# Patient Record
Sex: FEMALE
[De-identification: names, ages, dates, MRNs, and addresses within clinical notes are randomized per-mention and may not be internally consistent; named-entity substitution may affect disease eponyms.]

---

## 2024-05-22 ENCOUNTER — APPOINTMENT (OUTPATIENT)
Dept: UROLOGY | Facility: CLINIC | Age: 69
End: 2024-05-22
Payer: MEDICARE

## 2024-05-22 VITALS
BODY MASS INDEX: 32 KG/M2 | DIASTOLIC BLOOD PRESSURE: 73 MMHG | HEART RATE: 68 BPM | SYSTOLIC BLOOD PRESSURE: 121 MMHG | WEIGHT: 163 LBS | HEIGHT: 60 IN | OXYGEN SATURATION: 98 % | TEMPERATURE: 97.1 F

## 2024-05-22 DIAGNOSIS — Z80.0 FAMILY HISTORY OF MALIGNANT NEOPLASM OF DIGESTIVE ORGANS: ICD-10-CM

## 2024-05-22 DIAGNOSIS — M79.2 NEURALGIA AND NEURITIS, UNSPECIFIED: ICD-10-CM

## 2024-05-22 DIAGNOSIS — Z78.9 OTHER SPECIFIED HEALTH STATUS: ICD-10-CM

## 2024-05-22 DIAGNOSIS — Z85.22 PERSONAL HISTORY OF MALIGNANT NEOPLASM OF NASAL CAVITIES, MIDDLE EAR, AND ACCESSORY SINUSES: ICD-10-CM

## 2024-05-22 PROCEDURE — 99203 OFFICE O/P NEW LOW 30 MIN: CPT

## 2024-05-22 RX ORDER — GABAPENTIN 600 MG/1
600 TABLET ORAL
Refills: 0 | Status: ACTIVE | COMMUNITY

## 2024-05-22 RX ORDER — DICLOFENAC SODIUM 20 MG/G
SOLUTION TOPICAL
Refills: 0 | Status: ACTIVE | COMMUNITY

## 2024-05-22 RX ORDER — ROSUVASTATIN CALCIUM 10 MG/1
10 TABLET, FILM COATED ORAL
Refills: 0 | Status: ACTIVE | COMMUNITY

## 2024-05-22 RX ORDER — DOCUSATE SODIUM 100 MG/1
CAPSULE ORAL
Refills: 0 | Status: ACTIVE | COMMUNITY

## 2024-05-22 RX ORDER — CICLOPIROX OLAMINE 7.7 MG/G
CREAM TOPICAL
Refills: 0 | Status: ACTIVE | COMMUNITY

## 2024-05-28 LAB
APPEARANCE: CLEAR
BACTERIA UR CULT: NORMAL
BACTERIA: NEGATIVE /HPF
BILIRUBIN URINE: NEGATIVE
BLOOD URINE: NEGATIVE
CAST: 0 /LPF
COLOR: YELLOW
EPITHELIAL CELLS: 1 /HPF
GLUCOSE QUALITATIVE U: NEGATIVE MG/DL
KETONES URINE: NEGATIVE MG/DL
LEUKOCYTE ESTERASE URINE: ABNORMAL
MICROSCOPIC-UA: NORMAL
NITRITE URINE: NEGATIVE
PH URINE: 7.5
PROTEIN URINE: NORMAL MG/DL
RED BLOOD CELLS URINE: 1 /HPF
SPECIFIC GRAVITY URINE: 1.02
UROBILINOGEN URINE: 1 MG/DL
WHITE BLOOD CELLS URINE: 4 /HPF

## 2024-05-28 NOTE — HISTORY OF PRESENT ILLNESS
[FreeTextEntry1] : cc pressure in bladder  symptoms for last 6 weeks  sp pressure  has some hesitancy difficulty emptying  , feels inflamed urethra , urgency  had some mild tamiko  right lower back pain  no dysuria  given abx by pmd no cx  improved but symptoms return  took another abx for 7 days  symptoms return   no h/o kidney stone  nofwever

## 2024-05-29 ENCOUNTER — APPOINTMENT (OUTPATIENT)
Dept: UROLOGY | Facility: CLINIC | Age: 69
End: 2024-05-29
Payer: MEDICARE

## 2024-05-29 VITALS
HEART RATE: 68 BPM | HEIGHT: 60 IN | SYSTOLIC BLOOD PRESSURE: 129 MMHG | OXYGEN SATURATION: 98 % | TEMPERATURE: 97.3 F | WEIGHT: 163 LBS | DIASTOLIC BLOOD PRESSURE: 75 MMHG | BODY MASS INDEX: 32 KG/M2

## 2024-05-29 DIAGNOSIS — R39.9 UNSPECIFIED SYMPTOMS AND SIGNS INVOLVING THE GENITOURINARY SYSTEM: ICD-10-CM

## 2024-05-29 PROCEDURE — 76775 US EXAM ABDO BACK WALL LIM: CPT

## 2024-05-29 PROCEDURE — 99213 OFFICE O/P EST LOW 20 MIN: CPT

## 2024-05-29 NOTE — ASSESSMENT
[FreeTextEntry1] : 69 year old female with reviewing renal U/S results. Reviewed renal U/S results with pt..   Informed pt cyst on kidney is benign that kidneys are normal/benign, bladder is normal.  Recommended pt to keep using estragon cream for dryness and that is normal with menopause.  RTO if sx are present

## 2024-05-29 NOTE — END OF VISIT
[FreeTextEntry4] : This note was written by Scott Alvarez on 05/29/2024 actively solely Cali Yuan M.D. I, Scott Alvarez, am scribing for and in the presence of Cali Yuan M.D. in the following sections HISTORY OF PRESENT ILLNESS, PAST MEDICAL/FAMILY/SOCIAL HISTORY; REVIEW OF SYSTEMS; VITAL SIGNS; PHYSICAL EXAM; ASSESSMENT/PLAN.  All medical record entries made by this scribe at my, Cali Yuan M.D. direction and personally dictated by me on 05/29/2024. I personally performed the services described in the documentation, reviewed the documentation recorded by the scribe in my presence, and it accurately and completely records my words and actions.

## 2024-05-29 NOTE — HISTORY OF PRESENT ILLNESS
[FreeTextEntry1] : 5/22/2024 cc pressure in bladder symptoms for last 6 weeks sp pressure has some hesitancy difficulty emptying , feels inflamed urethra , urgency had some mild tamiko right lower back pain no dysuria given abx by pmd no cx improved but symptoms return took another abx for 7 days symptoms return  no h/o kidney stone no fever  05/29/2024 cc: review U/S images 69 year old female presents with reviewing renal U/S images. She states she went to the gynecologist for dryness and used estradiol cream and Epsom salt baths.   ua:negative

## 2024-06-03 PROBLEM — R39.9 LOWER URINARY TRACT SYMPTOMS (LUTS): Status: ACTIVE | Noted: 2024-05-22

## 2024-07-29 ENCOUNTER — APPOINTMENT (OUTPATIENT)
Dept: UROLOGY | Facility: CLINIC | Age: 69
End: 2024-07-29
Payer: MEDICARE

## 2024-07-29 VITALS
DIASTOLIC BLOOD PRESSURE: 90 MMHG | BODY MASS INDEX: 32 KG/M2 | OXYGEN SATURATION: 98 % | HEART RATE: 62 BPM | SYSTOLIC BLOOD PRESSURE: 152 MMHG | TEMPERATURE: 97 F | WEIGHT: 163 LBS | HEIGHT: 60 IN

## 2024-07-29 DIAGNOSIS — N39.0 URINARY TRACT INFECTION, SITE NOT SPECIFIED: ICD-10-CM

## 2024-07-29 DIAGNOSIS — R39.14 FEELING OF INCOMPLETE BLADDER EMPTYING: ICD-10-CM

## 2024-07-29 LAB
BILIRUB UR QL STRIP: NEGATIVE
GLUCOSE UR-MCNC: NEGATIVE
HCG UR QL: 1 EU/DL
HGB UR QL STRIP.AUTO: NEGATIVE
KETONES UR-MCNC: NEGATIVE
LEUKOCYTE ESTERASE UR QL STRIP: NEGATIVE
NITRITE UR QL STRIP: NEGATIVE
PH UR STRIP: 7
PROT UR STRIP-MCNC: NEGATIVE
SP GR UR STRIP: 1.02

## 2024-07-29 PROCEDURE — G2211 COMPLEX E/M VISIT ADD ON: CPT

## 2024-07-29 PROCEDURE — 99215 OFFICE O/P EST HI 40 MIN: CPT

## 2024-07-29 PROCEDURE — 99459 PELVIC EXAMINATION: CPT

## 2024-07-29 PROCEDURE — 81003 URINALYSIS AUTO W/O SCOPE: CPT | Mod: QW

## 2024-07-29 PROCEDURE — 51798 US URINE CAPACITY MEASURE: CPT

## 2024-07-29 NOTE — HISTORY OF PRESENT ILLNESS
[FreeTextEntry1] : 69F presents for initial evaluation of bladder prolapse   Self-referred   PMH significant for: neoplasm of nasal cavity, neuralgia   PSH significant for: hysterectomy, , nose surgery  Significant meds: gabapentin, rosuvastatin   Seen by Dr. Hoyt for urethral inflammation, bladder pressure, difficulty emptying  Recommended estrace cream   Patient reports No history of prolapse symptoms  Was told that she has a bladder prolapse by GYN Reports moderate level of distress  Pelvic Heaviness: No Vaginal Bulge: No Splinting: No Associated with: No Previously treated with: Pessary, poorly tolerated History of Abnormal PAP: n/a Amenable to Hysterectomy: , fibroids Wishes to maintain sexual function: Yes  Patient reports [id number] in the past year  There have been 0 culture-proven infections  Offending bacteria include: unknown Acute Symptoms of: pelvic pressure  Symptoms resolve with antibiotics. Denies associated hospitalizations.  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

## 2024-07-29 NOTE — PHYSICAL EXAM
[General Appearance - Well Developed] : well developed [General Appearance - Well Nourished] : well nourished [] : no respiratory distress [Urethral Meatus] : the meatus of the urethra showed no abnormalities [Normal Station and Gait] : the gait and station were normal for the patient's age [FreeTextEntry1] : Chaperone: IZABELLA Collins [de-identified] : no prolapse, mild BL levator spasm

## 2024-07-31 ENCOUNTER — NON-APPOINTMENT (OUTPATIENT)
Age: 69
End: 2024-07-31

## 2024-08-05 LAB — BACTERIA UR CULT: ABNORMAL

## 2024-10-29 ENCOUNTER — APPOINTMENT (OUTPATIENT)
Dept: UROLOGY | Facility: CLINIC | Age: 69
End: 2024-10-29
Payer: MEDICARE

## 2024-10-29 VITALS
SYSTOLIC BLOOD PRESSURE: 144 MMHG | BODY MASS INDEX: 31.41 KG/M2 | WEIGHT: 160 LBS | OXYGEN SATURATION: 98 % | HEIGHT: 60 IN | HEART RATE: 61 BPM | TEMPERATURE: 97.5 F | DIASTOLIC BLOOD PRESSURE: 72 MMHG

## 2024-10-29 DIAGNOSIS — M62.838 OTHER MUSCLE SPASM: ICD-10-CM

## 2024-10-29 DIAGNOSIS — N32.81 OVERACTIVE BLADDER: ICD-10-CM

## 2024-10-29 PROCEDURE — G2211 COMPLEX E/M VISIT ADD ON: CPT

## 2024-10-29 PROCEDURE — 99459 PELVIC EXAMINATION: CPT

## 2024-10-29 PROCEDURE — 99214 OFFICE O/P EST MOD 30 MIN: CPT

## 2024-10-29 PROCEDURE — 51798 US URINE CAPACITY MEASURE: CPT

## 2024-10-29 RX ORDER — OXYBUTYNIN CHLORIDE 10 MG/1
10 TABLET, EXTENDED RELEASE ORAL
Qty: 30 | Refills: 6 | Status: ACTIVE | COMMUNITY
Start: 2024-10-29 | End: 1900-01-01

## 2024-11-19 ENCOUNTER — APPOINTMENT (OUTPATIENT)
Dept: UROLOGY | Facility: CLINIC | Age: 69
End: 2024-11-19

## 2024-11-26 ENCOUNTER — APPOINTMENT (OUTPATIENT)
Dept: UROLOGY | Facility: CLINIC | Age: 69
End: 2024-11-26

## 2025-07-21 ENCOUNTER — EMERGENCY (EMERGENCY)
Facility: HOSPITAL | Age: 70
LOS: 1 days | End: 2025-07-21
Attending: STUDENT IN AN ORGANIZED HEALTH CARE EDUCATION/TRAINING PROGRAM
Payer: MEDICARE

## 2025-07-21 VITALS
SYSTOLIC BLOOD PRESSURE: 152 MMHG | RESPIRATION RATE: 17 BRPM | OXYGEN SATURATION: 97 % | HEART RATE: 64 BPM | DIASTOLIC BLOOD PRESSURE: 81 MMHG | TEMPERATURE: 98 F

## 2025-07-21 VITALS
TEMPERATURE: 99 F | RESPIRATION RATE: 18 BRPM | SYSTOLIC BLOOD PRESSURE: 160 MMHG | DIASTOLIC BLOOD PRESSURE: 93 MMHG | HEART RATE: 87 BPM | OXYGEN SATURATION: 99 %

## 2025-07-21 LAB
ALBUMIN SERPL ELPH-MCNC: 4.7 G/DL — SIGNIFICANT CHANGE UP (ref 3.3–5)
ALP SERPL-CCNC: 119 U/L — SIGNIFICANT CHANGE UP (ref 40–120)
ALT FLD-CCNC: 19 U/L — SIGNIFICANT CHANGE UP (ref 10–45)
ANION GAP SERPL CALC-SCNC: 15 MMOL/L — SIGNIFICANT CHANGE UP (ref 5–17)
APTT BLD: 38.6 SEC — HIGH (ref 26.1–36.8)
AST SERPL-CCNC: 17 U/L — SIGNIFICANT CHANGE UP (ref 10–40)
BASOPHILS # BLD AUTO: 0.02 K/UL — SIGNIFICANT CHANGE UP (ref 0–0.2)
BASOPHILS NFR BLD AUTO: 0.3 % — SIGNIFICANT CHANGE UP (ref 0–2)
BILIRUB SERPL-MCNC: 0.3 MG/DL — SIGNIFICANT CHANGE UP (ref 0.2–1.2)
BUN SERPL-MCNC: 11 MG/DL — SIGNIFICANT CHANGE UP (ref 7–23)
CALCIUM SERPL-MCNC: 9.6 MG/DL — SIGNIFICANT CHANGE UP (ref 8.4–10.5)
CHLORIDE SERPL-SCNC: 106 MMOL/L — SIGNIFICANT CHANGE UP (ref 96–108)
CO2 SERPL-SCNC: 22 MMOL/L — SIGNIFICANT CHANGE UP (ref 22–31)
CREAT SERPL-MCNC: 0.48 MG/DL — LOW (ref 0.5–1.3)
EGFR: 101 ML/MIN/1.73M2 — SIGNIFICANT CHANGE UP
EGFR: 101 ML/MIN/1.73M2 — SIGNIFICANT CHANGE UP
EOSINOPHIL # BLD AUTO: 0.1 K/UL — SIGNIFICANT CHANGE UP (ref 0–0.5)
EOSINOPHIL NFR BLD AUTO: 1.3 % — SIGNIFICANT CHANGE UP (ref 0–6)
GLUCOSE SERPL-MCNC: 118 MG/DL — HIGH (ref 70–99)
HCT VFR BLD CALC: 41.1 % — SIGNIFICANT CHANGE UP (ref 34.5–45)
HGB BLD-MCNC: 13.6 G/DL — SIGNIFICANT CHANGE UP (ref 11.5–15.5)
IMM GRANULOCYTES # BLD AUTO: 0.02 K/UL — SIGNIFICANT CHANGE UP (ref 0–0.07)
IMM GRANULOCYTES NFR BLD AUTO: 0.3 % — SIGNIFICANT CHANGE UP (ref 0–0.9)
INR BLD: 0.93 RATIO — SIGNIFICANT CHANGE UP (ref 0.85–1.16)
LYMPHOCYTES # BLD AUTO: 1.75 K/UL — SIGNIFICANT CHANGE UP (ref 1–3.3)
LYMPHOCYTES NFR BLD AUTO: 23 % — SIGNIFICANT CHANGE UP (ref 13–44)
MCHC RBC-ENTMCNC: 29.4 PG — SIGNIFICANT CHANGE UP (ref 27–34)
MCHC RBC-ENTMCNC: 33.1 G/DL — SIGNIFICANT CHANGE UP (ref 32–36)
MCV RBC AUTO: 88.8 FL — SIGNIFICANT CHANGE UP (ref 80–100)
MONOCYTES # BLD AUTO: 0.34 K/UL — SIGNIFICANT CHANGE UP (ref 0–0.9)
MONOCYTES NFR BLD AUTO: 4.5 % — SIGNIFICANT CHANGE UP (ref 2–14)
NEUTROPHILS # BLD AUTO: 5.38 K/UL — SIGNIFICANT CHANGE UP (ref 1.8–7.4)
NEUTROPHILS NFR BLD AUTO: 70.6 % — SIGNIFICANT CHANGE UP (ref 43–77)
NRBC # BLD AUTO: 0 K/UL — SIGNIFICANT CHANGE UP (ref 0–0)
NRBC # FLD: 0 K/UL — SIGNIFICANT CHANGE UP (ref 0–0)
NRBC BLD AUTO-RTO: 0 /100 WBCS — SIGNIFICANT CHANGE UP (ref 0–0)
PLATELET # BLD AUTO: 252 K/UL — SIGNIFICANT CHANGE UP (ref 150–400)
PMV BLD: 10.8 FL — SIGNIFICANT CHANGE UP (ref 7–13)
POTASSIUM SERPL-MCNC: 4 MMOL/L — SIGNIFICANT CHANGE UP (ref 3.5–5.3)
POTASSIUM SERPL-SCNC: 4 MMOL/L — SIGNIFICANT CHANGE UP (ref 3.5–5.3)
PROT SERPL-MCNC: 7.9 G/DL — SIGNIFICANT CHANGE UP (ref 6–8.3)
PROTHROM AB SERPL-ACNC: 10.7 SEC — SIGNIFICANT CHANGE UP (ref 9.9–13.4)
RBC # BLD: 4.63 M/UL — SIGNIFICANT CHANGE UP (ref 3.8–5.2)
RBC # FLD: 12.9 % — SIGNIFICANT CHANGE UP (ref 10.3–14.5)
SODIUM SERPL-SCNC: 143 MMOL/L — SIGNIFICANT CHANGE UP (ref 135–145)
TROPONIN T, HIGH SENSITIVITY RESULT: <6 NG/L — SIGNIFICANT CHANGE UP (ref 0–51)
WBC # BLD: 7.61 K/UL — SIGNIFICANT CHANGE UP (ref 3.8–10.5)
WBC # FLD AUTO: 7.61 K/UL — SIGNIFICANT CHANGE UP (ref 3.8–10.5)

## 2025-07-21 PROCEDURE — 70498 CT ANGIOGRAPHY NECK: CPT

## 2025-07-21 PROCEDURE — 99285 EMERGENCY DEPT VISIT HI MDM: CPT | Mod: 25

## 2025-07-21 PROCEDURE — 85025 COMPLETE CBC W/AUTO DIFF WBC: CPT

## 2025-07-21 PROCEDURE — 85610 PROTHROMBIN TIME: CPT

## 2025-07-21 PROCEDURE — 82962 GLUCOSE BLOOD TEST: CPT

## 2025-07-21 PROCEDURE — 70496 CT ANGIOGRAPHY HEAD: CPT

## 2025-07-21 PROCEDURE — 96374 THER/PROPH/DIAG INJ IV PUSH: CPT | Mod: XU

## 2025-07-21 PROCEDURE — 70496 CT ANGIOGRAPHY HEAD: CPT | Mod: 26

## 2025-07-21 PROCEDURE — 0042T: CPT

## 2025-07-21 PROCEDURE — 80053 COMPREHEN METABOLIC PANEL: CPT

## 2025-07-21 PROCEDURE — 82947 ASSAY GLUCOSE BLOOD QUANT: CPT

## 2025-07-21 PROCEDURE — 85730 THROMBOPLASTIN TIME PARTIAL: CPT

## 2025-07-21 PROCEDURE — 36415 COLL VENOUS BLD VENIPUNCTURE: CPT

## 2025-07-21 PROCEDURE — 83605 ASSAY OF LACTIC ACID: CPT

## 2025-07-21 PROCEDURE — 85018 HEMOGLOBIN: CPT

## 2025-07-21 PROCEDURE — 70450 CT HEAD/BRAIN W/O DYE: CPT

## 2025-07-21 PROCEDURE — 93005 ELECTROCARDIOGRAM TRACING: CPT

## 2025-07-21 PROCEDURE — 82435 ASSAY OF BLOOD CHLORIDE: CPT

## 2025-07-21 PROCEDURE — 84132 ASSAY OF SERUM POTASSIUM: CPT

## 2025-07-21 PROCEDURE — 96375 TX/PRO/DX INJ NEW DRUG ADDON: CPT | Mod: XU

## 2025-07-21 PROCEDURE — 70498 CT ANGIOGRAPHY NECK: CPT | Mod: 26

## 2025-07-21 PROCEDURE — 84484 ASSAY OF TROPONIN QUANT: CPT

## 2025-07-21 PROCEDURE — 85014 HEMATOCRIT: CPT

## 2025-07-21 PROCEDURE — 82803 BLOOD GASES ANY COMBINATION: CPT

## 2025-07-21 PROCEDURE — 70450 CT HEAD/BRAIN W/O DYE: CPT | Mod: 26,XU

## 2025-07-21 PROCEDURE — 93010 ELECTROCARDIOGRAM REPORT: CPT

## 2025-07-21 PROCEDURE — 84295 ASSAY OF SERUM SODIUM: CPT

## 2025-07-21 PROCEDURE — 82330 ASSAY OF CALCIUM: CPT

## 2025-07-21 PROCEDURE — 99285 EMERGENCY DEPT VISIT HI MDM: CPT

## 2025-07-21 RX ORDER — SODIUM CHLORIDE 9 G/1000ML
1000 INJECTION, SOLUTION INTRAVENOUS ONCE
Refills: 0 | Status: COMPLETED | OUTPATIENT
Start: 2025-07-21 | End: 2025-07-21

## 2025-07-21 RX ORDER — KETOROLAC TROMETHAMINE 30 MG/ML
15 INJECTION, SOLUTION INTRAMUSCULAR; INTRAVENOUS ONCE
Refills: 0 | Status: DISCONTINUED | OUTPATIENT
Start: 2025-07-21 | End: 2025-07-21

## 2025-07-21 RX ORDER — METOCLOPRAMIDE HCL 10 MG
10 TABLET ORAL ONCE
Refills: 0 | Status: COMPLETED | OUTPATIENT
Start: 2025-07-21 | End: 2025-07-21

## 2025-07-21 RX ORDER — ACETAMINOPHEN 500 MG/5ML
1000 LIQUID (ML) ORAL ONCE
Refills: 0 | Status: COMPLETED | OUTPATIENT
Start: 2025-07-21 | End: 2025-07-21

## 2025-07-21 RX ADMIN — SODIUM CHLORIDE 1000 MILLILITER(S): 9 INJECTION, SOLUTION INTRAVENOUS at 15:40

## 2025-07-21 RX ADMIN — KETOROLAC TROMETHAMINE 15 MILLIGRAM(S): 30 INJECTION, SOLUTION INTRAMUSCULAR; INTRAVENOUS at 15:41

## 2025-07-21 RX ADMIN — Medication 10 MILLIGRAM(S): at 15:41

## 2025-07-21 RX ADMIN — Medication 400 MILLIGRAM(S): at 15:41

## 2025-07-21 RX ADMIN — Medication 1000 MILLIGRAM(S): at 16:42

## 2025-07-21 RX ADMIN — KETOROLAC TROMETHAMINE 15 MILLIGRAM(S): 30 INJECTION, SOLUTION INTRAMUSCULAR; INTRAVENOUS at 16:42

## 2025-07-21 NOTE — ED ADULT NURSE NOTE - NSFALLUNIVINTERV_ED_ALL_ED
Bed/Stretcher in lowest position, wheels locked, appropriate side rails in place/Call bell, personal items and telephone in reach/Instruct patient to call for assistance before getting out of bed/chair/stretcher/Non-slip footwear applied when patient is off stretcher/[place] to call system/Physically safe environment - no spills, clutter or unnecessary equipment/Purposeful proactive rounding/Room/bathroom lighting operational, light cord in reach

## 2025-07-21 NOTE — ED PROVIDER NOTE - CLINICAL SUMMARY MEDICAL DECISION MAKING FREE TEXT BOX
70-year-old female with history of hypertension, hyperlipidemia, Bell's palsy with baseline right facial droop, diabetes on metformin brought in by EMS from doctor's office for acute onset occipital headache.   Patient was accompanied  to his doctor's appointment around 11:30 AM when she experienced sudden onset posterior headache, severe.  No vision change, weakness, numbness, tingling or new facial droop. Accompanied by daughter at bedside who reports patient at the same time was confused, forgetful about questions but AAO X4 still.  Concerned as patient at baseline is not forgetful. Code stroke at triage.  Vitals on presentation nonactionable.  Exam neuroexam nonfocal except for baseline right facial droop.  will obtain CTA head and neck to eval for aneurysmal changes versus SAH.  Low concern for acute CVA given no focal neurologic deficits.  pain control tylenol, toradol, reglan, Dispo pending workup.

## 2025-07-21 NOTE — ED ADULT NURSE NOTE - NS ED NURSE LEVEL OF CONSCIOUSNESS AFFECT
-- DO NOT REPLY / DO NOT REPLY ALL --  -- Message is from [employer] [facility] [employer] ([facility]) --    General Patient Message: Patient is calling because she has a really bad sinus infection and is asking if she can be prescribed a medication that Dr. Alexander has prescribed her before. Please calll her back.    Caller Information       Type Contact Phone/Fax    11/27/2023 08:34 AM CST Phone (Incoming) Moshe Harding Katty GREEN (Self) [phone] (M)        Alternative phone number: No    Can a detailed message be left? Yes    Message Turnaround:     Is it Working Hours? Yes - Working Hours     [place]:    Please give this turnaround time to the caller:   \"This message will be sent to [state Provider's name]. The clinical team will fulfill your request as soon as they review your message.\"                 Calm/Appropriate

## 2025-07-21 NOTE — ED PROVIDER NOTE - PATIENT PORTAL LINK FT
You can access the FollowMyHealth Patient Portal offered by [facility] by registering at the following website: [email or web address]. By joining [employer]’s FollowMyHealth portal, you will also be able to view your health information using other applications (apps) compatible with our system.

## 2025-07-21 NOTE — ED PROVIDER NOTE - NSFOLLOWUPCLINICS_GEN_ALL_ED_FT
[facility]  Neurology Autoimmune Encephalitis  [address] [place] [address]  Phone: [phone]  Fax: [phone]  Follow Up Time: 7-10 Days    [facility]  Neurology  [address] [place] [id number]  Phone: [phone]  Fax:   Follow Up Time: 7-10 Days

## 2025-07-21 NOTE — ED ADULT TRIAGE NOTE - CHIEF COMPLAINT QUOTE
at doctors apt with her  and  stated she suddenly became confused 12pm  , has facial droop from bells palsy, has a headache

## 2025-07-21 NOTE — ED PROVIDER NOTE - PHYSICAL EXAMINATION
Neuro: CN II-XII intact aside from existing facial droop. No dysmetria noted. Normal gait. Strength 5/5 in both upper and lower limbs. Sensation grossly intact in all limbs

## 2025-07-21 NOTE — ED PROVIDER NOTE - NSFOLLOWUPINSTRUCTIONS_ED_ALL_ED_FT
Are there different types of headaches?    Yes. There are different types of headaches. They include:    ?Primary headaches – "Primary" means the headache is the main problem, not a symptom of another condition. Most primary headaches fall into 1 of these categories:    •Tension headaches – These cause pressure or tightness on both sides of the head. This is the most common type.    •Migraine headaches – Migraine is a condition that involves a headache as well as other symptoms. Migraine headaches, or "attacks," often start mild and then get worse. They often affect just 1 side of the head. The pain often feels like it is pounding or throbbing. Routine activities like walking or climbing stairs can make the headache worse. Migraine can also cause nausea or vomiting, or make you sensitive to light and sound.    •Cluster headaches – These affect 1 side of the head. They start quickly, happen frequently, and are very painful. They also cause other symptoms on the same side of the face where the headache is. For example, you might get a droopy or watery eye, a stuffy nose, or facial sweating on 1 side.    ?Secondary headaches – "Secondary" means the headache is related to another health problem. For example, infections, illnesses, or medicines can cause headaches. Your doctor or nurse will help figure out if your headache is related to another condition.    What might cause primary headaches?    Some people find their headaches are triggered by certain foods or things they do. To get an idea of what might be causing your headaches, you can keep a "headache diary." This involves writing down every time you have a headache and what you ate and did before it started.    Some common headache triggers include:    ?Stress    ?Skipping meals or eating too little    ?Having too little or too much caffeine    ?Sleeping too much or too little    ?Drinking alcohol    ?Certain foods or drinks    ?Not drinking enough water    You can also write down what medicine you took for the headache and whether or not it helped.    Will I need tests?    It depends. Your doctor or nurse will ask you questions about the pattern of your headaches and do an exam.    If your doctor or nurse thinks your headache might be related to another health problem, they might do tests. These might include a CT scan, MRI, or other imaging tests. (Imaging tests create pictures of the inside of the body.)    How can I prevent getting headaches?    If you know what things trigger your headache, avoid them if possible. For example, it might help to:    ?Change your eating or sleeping patterns.    ?Learn relaxation techniques and healthy ways to manage stress.    ?Make healthy lifestyle changes, like quitting smoking and getting more physical activity.    If your headaches are frequent, severe, or long-lasting, your doctor can suggest ways to try to prevent them. In some cases, medicines can also help.    How are headaches treated?    There are lots of medicines that can relieve a headache. The right medicine for you depends on what type of headaches you get, how often you get them, and how bad they are. Some medicines treat headaches, and others are taken every day to try to prevent headaches.    Some medicines to treat common headaches (such as tension-type or migraine headaches) are available without a prescription. These include:    ?Acetaminophen (sample brand name: Tylenol)    ?Ibuprofen (sample brand names: Advil, Motrin)    ?Naproxen (sample brand name: Aleve)    There are prescription medicines that can help with pain, too.    If you get headaches often, work with your doctor to find a treatment that helps. Do not try to manage frequent headaches on your own with non-prescription pain medicines. Taking these too often can actually cause more headaches later.    What else can I do on my own?    It might help to rest in a cool, dark, quiet room. This works best for migraine attacks. It might help to put a cold compress on your head or neck.    When should I call the doctor?    Call for an ambulance (in the [place] and [place], call 9-1-1) if:    ?Your headache starts suddenly, quickly becomes severe, or could be described as "the worst headache of your life."    ?You also have a seizure, personality changes, or confusion, or you pass out.    ?You have weakness, numbness, trouble speaking, or trouble seeing. (Migraine can sometimes cause these symptoms, but you should be seen right away the first time these symptoms happen.)    Call your doctor or nurse if:    ?You get frequent or severe headaches.    ?Your headache began after you exercised or had a minor injury.    ?You have new headaches, especially if you are pregnant or older than 50.    ?You have a fever or stiff neck with your headache.      Your evaluation in the emergency room actually including blood work and CT did not reveal dangerous cause of your headache.  You were evaluated by neurology as well who recommend that you follow-up with them outpatient in clinic within 1 week of discharge for brain MRI.    Please follow-up with neurology within 1 week of discharge. Are there different types of headaches?    Yes. There are different types of headaches. They include:    ?Primary headaches – "Primary" means the headache is the main problem, not a symptom of another condition. Most primary headaches fall into 1 of these categories:    •Tension headaches – These cause pressure or tightness on both sides of the head. This is the most common type.    •Migraine headaches – Migraine is a condition that involves a headache as well as other symptoms. Migraine headaches, or "attacks," often start mild and then get worse. They often affect just 1 side of the head. The pain often feels like it is pounding or throbbing. Routine activities like walking or climbing stairs can make the headache worse. Migraine can also cause nausea or vomiting, or make you sensitive to light and sound.    •Cluster headaches – These affect 1 side of the head. They start quickly, happen frequently, and are very painful. They also cause other symptoms on the same side of the face where the headache is. For example, you might get a droopy or watery eye, a stuffy nose, or facial sweating on 1 side.    ?Secondary headaches – "Secondary" means the headache is related to another health problem. For example, infections, illnesses, or medicines can cause headaches. Your doctor or nurse will help figure out if your headache is related to another condition.    What might cause primary headaches?    Some people find their headaches are triggered by certain foods or things they do. To get an idea of what might be causing your headaches, you can keep a "headache diary." This involves writing down every time you have a headache and what you ate and did before it started.    Some common headache triggers include:    ?Stress    ?Skipping meals or eating too little    ?Having too little or too much caffeine    ?Sleeping too much or too little    ?Drinking alcohol    ?Certain foods or drinks    ?Not drinking enough water    You can also write down what medicine you took for the headache and whether or not it helped.    Will I need tests?    It depends. Your doctor or nurse will ask you questions about the pattern of your headaches and do an exam.    If your doctor or nurse thinks your headache might be related to another health problem, they might do tests. These might include a CT scan, MRI, or other imaging tests. (Imaging tests create pictures of the inside of the body.)    How can I prevent getting headaches?    If you know what things trigger your headache, avoid them if possible. For example, it might help to:    ?Change your eating or sleeping patterns.    ?Learn relaxation techniques and healthy ways to manage stress.    ?Make healthy lifestyle changes, like quitting smoking and getting more physical activity.    If your headaches are frequent, severe, or long-lasting, your doctor can suggest ways to try to prevent them. In some cases, medicines can also help.    How are headaches treated?    There are lots of medicines that can relieve a headache. The right medicine for you depends on what type of headaches you get, how often you get them, and how bad they are. Some medicines treat headaches, and others are taken every day to try to prevent headaches.    Some medicines to treat common headaches (such as tension-type or migraine headaches) are available without a prescription. These include:    ?Acetaminophen (sample brand name: Tylenol)    ?Ibuprofen (sample brand names: Advil, Motrin)    ?Naproxen (sample brand name: Aleve)    There are prescription medicines that can help with pain, too.    If you get headaches often, work with your doctor to find a treatment that helps. Do not try to manage frequent headaches on your own with non-prescription pain medicines. Taking these too often can actually cause more headaches later.    What else can I do on my own?    It might help to rest in a cool, dark, quiet room. This works best for migraine attacks. It might help to put a cold compress on your head or neck.    When should I call the doctor?    Call for an ambulance (in the [place] and [place], call 9-1-1) if:    ?Your headache starts suddenly, quickly becomes severe, or could be described as "the worst headache of your life."    ?You also have a seizure, personality changes, or confusion, or you pass out.    ?You have weakness, numbness, trouble speaking, or trouble seeing. (Migraine can sometimes cause these symptoms, but you should be seen right away the first time these symptoms happen.)    Call your doctor or nurse if:    ?You get frequent or severe headaches.    ?Your headache began after you exercised or had a minor injury.    ?You have new headaches, especially if you are pregnant or older than 50.    ?You have a fever or stiff neck with your headache.      Your evaluation in the emergency room actually including blood work and CT did not reveal dangerous cause of your headache.  You were evaluated by neurology as well who recommend that you follow-up with them outpatient in clinic within 1 week of discharge for brain MRI.    To control your pain at home, you should take Ibuprofen 400 mg along with Tylenol 650mg-1000mg every 6 to 8 hours. Limit your maximum daily Tylenol from all sources to 4000mg. Be aware that many other medications contain acetaminophen which is also known as Tylenol. Taking Tylenol and Ibuprofen together has been shown to be more effective at relieving pain than taking them separately. These are both over the counter medications that you can  at your local pharmacy without a prescription. You need to respect all of the warnings on the bottles. You shouldn’t take these medications for more than a week without following up with your doctor. Both medications come with certain risks and side effects that you need to discuss with your doctor, especially if you are taking them for a prolonged period.    Please follow-up with neurology within 1 week of discharge.

## 2025-07-21 NOTE — ED ADULT NURSE REASSESSMENT NOTE - GENERAL PATIENT STATE
4 cottonoids (2 cottonoids in each nare) in nares tied together. To be removed in recovery.   
improvement verbalized/family/SO at bedside
comfortable appearance/cooperative
comfortable appearance/cooperative

## 2025-07-21 NOTE — ED ADULT NURSE NOTE - NEURO MENTATION
LM to reschedule the appointment from 3pm to 2pm.  I made the change so the appointment time is saved.  If this time does not work, please reschedule for another day.   normal

## 2025-07-21 NOTE — CONSULT NOTE ADULT - SUBJECTIVE AND OBJECTIVE BOX
Neurology - Consult Note    -  Spectra: [phone] ([facility]), [phone] ([facility])  -    HPI: Patient ANDREW LAYNE is a 70y (1955) wo/man with a PMHx significant for ***    LKW   NIHSS 1 (facial droop, bell's palsy since 2012)  preMRS 0  Pt not IV tenecteplase candidate because outside of window  Pt not thrombectomy candidate because no LVO     Review of Systems:  INCOMPLETE   CONSTITUTIONAL: No fevers or chills  EYES AND ENT: No visual changes or no throat pain   NECK: No pain or stiffness  RESPIRATORY: No hemoptysis or shortness of breath  CARDIOVASCULAR: No chest pain or palpitations  GASTROINTESTINAL: No melena or hematochezia  GENITOURINARY: No dysuria or hematuria  NEUROLOGICAL: +As stated in HPI above  SKIN: No itching, burning, rashes, or lesions   All other review of systems is negative unless indicated above.    Allergies:  No Known Allergies      PMHx/PSHx/Family Hx: As above, otherwise see below       Social Hx:  No current use of tobacco, alcohol, or illicit drugs  Lives with ***    Medications:  MEDICATIONS  (STANDING):    MEDICATIONS  (PRN):      Vitals:  T(C): 37.1 (07-21-25 @ 14:15), Max: 37.1 (07-21-25 @ 14:15)  HR: 87 (07-21-25 @ 14:15) (87 - 87)  BP: 160/93 (07-21-25 @ 14:15) (160/93 - 160/93)  RR: 18 (07-21-25 @ 14:15) (18 - 18)  SpO2: 99% (07-21-25 @ 14:15) (99% - 99%)    Physical Examination: INCOMPLETE  General - NAD  Cardiovascular - Peripheral pulses palpable, no edema  Eyes - Fundoscopy with flat, sharp optic discs and no hemorrhage or exudates; Fundoscopy not well visualized; Fundoscopy not performed due to safety precautions in the setting of infection risk    Neurologic Exam:  Mental status - Awake, Alert, Oriented to person, place, and time. Speech fluent, repetition and naming intact. Follows simple and cross commands. Attention/concentration, recent and remote memory (including registration and recall), and fund of knowledge intact    Cranial nerves - PERRLA, VFF, EOMI, face sensation (V1-V3) intact b/l, facial strength intact without asymmetry b/l, hearing intact b/l, palate with symmetric elevation, trapezius OR sternocleidomastoid 5/5 strength b/l, tongue midline on protrusion with full lateral movement    Motor - Normal bulk and tone throughout. No pronator drift.  Strength testing            Deltoid      Biceps      Triceps     Wrist Extension    Wrist Flexion     Interossei         R            5                 5               5                     5                              5                        5                 5  L             5                 5               5                     5                              5                        5                 5              Hip Flexion    Hip Extension    Knee Flexion    Knee Extension    Dorsiflexion    Plantar Flexion  R              5                           5                       5                           5                            5                          5  L              5                           5                        5                           5                            5                          5    Sensation - Light touch/temperature OR pain/vibration intact throughout    DTR's -             Biceps      Triceps     Brachioradialis      Patellar    Ankle    Toes/plantar response  R             2+             2+                  2+                       2+            2+                 Down  L              2+             2+                 2+                        2+           2+                 Down    Coordination - Finger to Nose intact b/l. No tremors appreciated    Gait and station - Normal casual gait. Romberg (-)    Labs:                        13.6   7.61  )-----------( 252      ( 21 Jul 2025 14:29 )             41.1           CAPILLARY BLOOD GLUCOSE      POCT Blood Glucose.: 139 mg/dL (21 Jul 2025 14:19)          CSF:                  Radiology:     Neurology - Consult Note    -  Spectra: [phone] ([facility]), [phone] ([facility])  -    HPI: Patient ANDREW LAYNE is a 70y (1955) woman with a PMHx significant for HTN, HLD, DM2, who presents for new sudden onset headache. She was taking her  to the doctor this morning at 11am when she felt the headache come on suddenly. She did not note any changes in her vision or nausea at the time. She says she has a history of headaches in the morning which she attributed to low blood sugar in the mornings due to her metformin but notes that this one is different. Her current headache localizes to the back of her head, while her prior headaches were bilateral frontotemporal in nature. Her daughter at beside also feels that the patient is slightly more confused and having difficulty forming memories at this time. Her blood pressure is currently 160/80s and her BG is within acceptable range. She has not yet tried any medications for the headache. She does not note any photophobia or phonophobia, but has not felt any changes in her headache quality or severity since it peaked in intensity.    LKW   NIHSS 1 (facial droop, bell's palsy since 2012)  preMRS 0  Pt not IV tenecteplase candidate because outside of window  Pt not thrombectomy candidate because no LVO     Review of Systems:   All other review of systems is negative unless indicated above.    Allergies:  No Known Allergies      PMHx/PSHx/Family Hx: As above, otherwise see below       Social Hx:  No current use of tobacco, alcohol, or illicit drugs      Medications:  MEDICATIONS  (STANDING):    MEDICATIONS  (PRN):      Vitals:  T(C): 37.1 (07-21-25 @ 14:15), Max: 37.1 (07-21-25 @ 14:15)  HR: 87 (07-21-25 @ 14:15) (87 - 87)  BP: 160/93 (07-21-25 @ 14:15) (160/93 - 160/93)  RR: 18 (07-21-25 @ 14:15) (18 - 18)  SpO2: 99% (07-21-25 @ 14:15) (99% - 99%)    Physical Examination: INCOMPLETE  General - NAD  Cardiovascular - Peripheral pulses palpable, no edema  Eyes - Fundoscopy with flat, sharp optic discs and no hemorrhage or exudates; Fundoscopy not well visualized; Fundoscopy not performed due to safety precautions in the setting of infection risk    Neurologic Exam:  Mental status - Awake, Alert, Oriented to person, place, and time. Speech fluent, repetition and naming intact. Follows simple and cross commands. Attention/concentration, recent and remote memory (including registration and recall), and fund of knowledge intact    Cranial nerves - PERRLA, VFF, EOMI, face sensation (V1-V3) intact b/l, facial strength intact without asymmetry b/l, hearing intact b/l, palate with symmetric elevation, trapezius OR sternocleidomastoid 5/5 strength b/l, tongue midline on protrusion with full lateral movement    Motor - Normal bulk and tone throughout. No pronator drift.  Strength testing            Deltoid      Biceps      Triceps     Wrist Extension    Wrist Flexion     Interossei         R            5                 5               5                     5                              5                        5                 5  L             5                 5               5                     5                              5                        5                 5              Hip Flexion    Hip Extension    Knee Flexion    Knee Extension    Dorsiflexion    Plantar Flexion  R              5                           5                       5                           5                            5                          5  L              5                           5                        5                           5                            5                          5    Sensation - Light touch/temperature OR pain/vibration intact throughout    DTR's -             Biceps      Triceps     Brachioradialis      Patellar    Ankle    Toes/plantar response  R             2+             2+                  2+                       2+            2+                 Down  L              2+             2+                 2+                        2+           2+                 Down    Coordination - Finger to Nose intact b/l. No tremors appreciated    Gait and station - Normal casual gait. Romberg (-)    Labs:                        13.6   7.61  )-----------( 252      ( 21 Jul 2025 14:29 )             41.1           CAPILLARY BLOOD GLUCOSE      POCT Blood Glucose.: 139 mg/dL (21 Jul 2025 14:19)          CSF:                  Radiology:  < from: CT Brain Stroke Protocol (07.21.25 @ 14:47) >  FINDINGS:    No acute transcortical infarction or acute intracranial hemorrhage.    No hydrocephalus. No extra-axial fluid collections.    The visualized intraorbital contents are unremarkable. Trace mucosal   thickening within the right maxillary sinus The mastoid air cells are   clear. The visualized soft tissues and osseous structures appear   unremarkable.    IMPRESSION:    No intracranial hemorrhage or other acute intracranial pathology.       Neurology - Consult Note    -  Spectra: [phone] ([facility]), [phone] ([facility])  -    HPI: Patient ANDREW LAYNE is a 70y (1955) woman with a PMHx significant for HTN, HLD, DM2, who presents for new sudden onset headache. She was taking her  to the doctor this morning at 11am when she felt the headache come on suddenly. She did not note any changes in her vision or nausea at the time. She says she has a history of headaches in the morning which she attributed to low blood sugar in the mornings due to her metformin but notes that this one is different. Her current headache localizes to the back of her head, while her prior headaches were bilateral frontotemporal in nature. Her daughter at beside also feels that the patient is slightly more confused and having difficulty forming memories at this time. Her blood pressure is currently 160/80s and her BG is within acceptable range. She has not yet tried any medications for the headache. She does not note any photophobia or phonophobia, but has not felt any changes in her headache location, but feels that it is getting worse and deeper. She feels worse with lying down for her headache, but does not note any worsening with moving her head.    LKW 11:00 7/21/25  NIHSS 1 (facial droop and sensory loss 2/2 bell's palsy since 2012)  preMRS 0  Pt not IV tenecteplase candidate because low NIHSS  Pt not thrombectomy candidate because no LVO     Review of Systems:   All other review of systems is negative unless indicated above.    Allergies:  No Known Allergies      PMHx/PSHx/Family Hx: As above, otherwise see below       Social Hx:  No current use of tobacco, alcohol, or illicit drugs      Medications:  MEDICATIONS  (STANDING):    MEDICATIONS  (PRN):      Vitals:  T(C): 37.1 (07-21-25 @ 14:15), Max: 37.1 (07-21-25 @ 14:15)  HR: 87 (07-21-25 @ 14:15) (87 - 87)  BP: 160/93 (07-21-25 @ 14:15) (160/93 - 160/93)  RR: 18 (07-21-25 @ 14:15) (18 - 18)  SpO2: 99% (07-21-25 @ 14:15) (99% - 99%)    Physical Examination:  General - NAD  Cardiovascular - Peripheral pulses palpable, no edema    Neurologic Exam:  Mental status - Awake, Alert, Oriented to person, place, and time. Speech fluent, repetition and naming intact. Follows simple commands. Attention/concentration, recent and remote memory, and fund of knowledge intact    Cranial nerves - PERRLA, VFF, EOMI, face sensation (V1-V3) intact b/l, facial strength intact without asymmetry b/l, hearing intact b/l, palate with symmetric elevation, trapezius OR sternocleidomastoid 5/5 strength b/l, tongue midline on protrusion with full lateral movement    Motor - Normal bulk and tone throughout. No pronator drift.  Strength testing            Deltoid      Biceps      Triceps     Wrist Extension    Wrist Flexion            R            5                 5               5                     5                              5                  5               L             5                 5               5                     5                              5                  5                      Hip Flexion    Hip Extension    Knee Flexion    Knee Extension    Dorsiflexion    Plantar Flexion  R              5                           5                       5                           5                            5                          5  L              5                           5                        5                           5                            5                          5    Sensation - Light touch intact throughout    DTR's -             Biceps      Triceps     Brachioradialis      Patellar    Ankle     R             2+             2+                  2+                       2+            2+            L              2+             2+                 2+                        2+           2+           Coordination - Finger to Nose intact b/l. No tremors appreciated    Gait and station - Normal casual gait.    Labs:                        13.6   7.61  )-----------( 252      ( 21 Jul 2025 14:29 )             41.1           CAPILLARY BLOOD GLUCOSE      POCT Blood Glucose.: 139 mg/dL (21 Jul 2025 14:19)          CSF:                  Radiology:  < from: CT Brain Stroke Protocol (07.21.25 @ 14:47) >  FINDINGS:    No acute transcortical infarction or acute intracranial hemorrhage.    No hydrocephalus. No extra-axial fluid collections.    The visualized intraorbital contents are unremarkable. Trace mucosal   thickening within the right maxillary sinus The mastoid air cells are   clear. The visualized soft tissues and osseous structures appear   unremarkable.    IMPRESSION:    No intracranial hemorrhage or other acute intracranial pathology.

## 2025-07-21 NOTE — ED ADULT NURSE NOTE - OBJECTIVE STATEMENT
Receive pt from triage at CT scan with daughter stating confusion/AMS since 1200 today.  Now she doesn't seem confused but with short term memory loss Pt A/O x 3 at present and states "I woke up at 0800 and was fine, then while I was with my  at his doctors appt at 1100. Then at about 1130 I started getting a h/a to the back of my head. No vision changes/n/v/dizziness. baseline rt facial droop from [place] Palsy."

## 2025-07-21 NOTE — CONSULT NOTE ADULT - ASSESSMENT
Assessment:      Impression:         Migraine Abortive therapies to trial:  [] First line: recommend migraine medications (to be given all together at the same time): ketorolac (Toradol) 30mg IV q8h PRN (or acetaminophen 1g IV q8h PRN), metoclopramide (Reglan) 10mg q8h PRN, diphenhydramine (Benadryl) 25mg IV q8h PRN. Please repeat at least 2-3 cycles for medications to be effective.  [] IV hydration, Mg 2g IV x1  [] Second line: recommend solumedrol 125mg IV PRN x1  [] Third line: recommend depakote 500mg IV PRN x1   Assessment: 70y (1955) woman with a PMHx significant for HTN, HLD, DM2, bells palsy (R facial droop and sensory loss since 2012) who presents for new sudden onset headache and transient confusion this morning with unremarkable neuro exam and no hemorrhage on CTH and no perfusion mismatch on CTP.    LKW 11:00 7/21/25  NIHSS 1 (facial droop and sensory loss 2/2 bell's palsy since 2012)  preMRS 0  Pt not IV tenecteplase candidate because low NIHSS  Pt not thrombectomy candidate because no LVO     Impression: New sudden onset posterior headache and transient confusion without focal neurological signs and negative imaging in a patient with history of bilateral frontotemporal headache. Low suspicion for acute ischemic or hemorrhagic event. Low concern for meningitis given patient is afebrile. Patient outside of criteria for migraine. More likely to be tension headache of radiation of back/msk pain.     Recommendations:  [] no further neurovascular workup at this time    Migraine Abortive therapies to trial for this headache:  [] First line: recommend migraine medications (to be given all together at the same time): ketorolac (Toradol) 30mg IV q8h PRN (or acetaminophen 1g IV q8h PRN), metoclopramide (Reglan) 10mg q8h PRN, diphenhydramine (Benadryl) 25mg IV q8h PRN. Please repeat at least 2-3 cycles for medications to be effective.  [] IV hydration, Mg 2g IV x1  [] Second line: recommend solumedrol 125mg IV PRN x1  [] Third line: recommend depakote 500mg IV PRN x1    Case staffed and discussed with stroke fellow Dr. Henderson under supervision of stroke attending Dr. Sullivan

## 2025-07-21 NOTE — ED PROVIDER NOTE - PROGRESS NOTE DETAILS
JENNY Peguero MD PGY-3: Per stroke fellow, still concern for CVA given posterior stroke cannot have confusion and headache as the only symptoms.  Will obtain perfusion imaging. JENNY Peguero MD PGY-3: Patient reassessed, reports pain is much improved and now is 2/10, patient back to her normal mental baseline.  Daughter at bedside concern for patient's previous episode with acute change in mental status did not know where she was and was forgetful asking the same question repeatedly.  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  Never obtained MRI.  Spoke with neurology resident as well, would like patient to follow-up with neurologist outpatient for MRI.  Given return precautions.  Verbalized understanding.

## 2025-07-21 NOTE — ED PROVIDER NOTE - ATTENDING CONTRIBUTION TO CARE
Elderly woman with PMHx of HTN, DM p/w sudden onset headache and confusion. Has existing facial droop from previous Bell palsy. No focal deficits and so likelihood of ischemic stroke is low. Given new severe headache imaging for possible bleed vs. pathology. If imaging negative; low suspicion for SAH and given timeframe; no need for LP. Otherwise is AAO*4 currently. Will continue to evaluate for labwork abnormalities.

## 2025-07-23 PROBLEM — G51.0 BELL'S PALSY: Chronic | Status: ACTIVE | Noted: 2025-07-21

## 2025-08-18 ENCOUNTER — NON-APPOINTMENT (OUTPATIENT)
Age: 70
End: 2025-08-18

## 2025-08-20 ENCOUNTER — APPOINTMENT (OUTPATIENT)
Dept: NEUROSURGERY | Facility: CLINIC | Age: 70
End: 2025-08-20
Payer: MEDICARE

## 2025-08-20 ENCOUNTER — NON-APPOINTMENT (OUTPATIENT)
Age: 70
End: 2025-08-20

## 2025-08-20 VITALS
TEMPERATURE: 98.4 F | SYSTOLIC BLOOD PRESSURE: 155 MMHG | DIASTOLIC BLOOD PRESSURE: 75 MMHG | HEART RATE: 72 BPM | WEIGHT: 156 LBS | BODY MASS INDEX: 30.47 KG/M2 | OXYGEN SATURATION: 97 %

## 2025-08-20 DIAGNOSIS — I67.1 CEREBRAL ANEURYSM, NONRUPTURED: ICD-10-CM

## 2025-08-20 PROCEDURE — 99205 OFFICE O/P NEW HI 60 MIN: CPT

## 2025-08-21 ENCOUNTER — APPOINTMENT (OUTPATIENT)
Dept: MRI IMAGING | Facility: CLINIC | Age: 70
End: 2025-08-21

## 2025-08-21 PROCEDURE — 70546 MR ANGIOGRAPH HEAD W/O&W/DYE: CPT

## 2025-08-21 PROCEDURE — A9585: CPT | Mod: JW

## 2025-09-17 ENCOUNTER — APPOINTMENT (OUTPATIENT)
Dept: NEUROSURGERY | Facility: CLINIC | Age: 70
End: 2025-09-17

## 2025-09-17 VITALS
HEART RATE: 69 BPM | TEMPERATURE: 98.6 F | SYSTOLIC BLOOD PRESSURE: 144 MMHG | OXYGEN SATURATION: 97 % | DIASTOLIC BLOOD PRESSURE: 78 MMHG

## 2025-09-17 DIAGNOSIS — I67.1 CEREBRAL ANEURYSM, NONRUPTURED: ICD-10-CM
